# Patient Record
Sex: FEMALE | Race: WHITE
[De-identification: names, ages, dates, MRNs, and addresses within clinical notes are randomized per-mention and may not be internally consistent; named-entity substitution may affect disease eponyms.]

---

## 2023-07-22 ENCOUNTER — HOSPITAL ENCOUNTER (OUTPATIENT)
Dept: HOSPITAL 76 - DI.S | Age: 73
Discharge: HOME | End: 2023-07-22
Attending: PHYSICIAN ASSISTANT
Payer: MEDICARE

## 2023-07-22 DIAGNOSIS — M77.31: ICD-10-CM

## 2023-07-22 DIAGNOSIS — S92.354A: Primary | ICD-10-CM

## 2023-07-22 DIAGNOSIS — M19.071: ICD-10-CM

## 2023-07-22 NOTE — XRAY REPORT
PROCEDURE:  Foot 3 View RT

 

INDICATIONS:  RIGHT FOOT PAIN

 

TECHNIQUE:  3 views of the foot were acquired.  

 

COMPARISON:  None.

 

FINDINGS:  

 

Bones:  Nondisplaced transverse fracture involving the base of the right fifth metatarsal with overly
ing soft tissue edema. Moderate-severe degenerative changes of the right first metatarsophalangeal song
int. Degenerative changes of the dorsal right midfoot. Prominent plantar calcaneal enthesophyte.  No 
suspicious bony lesions.  

 

Soft tissues:  No suspicious soft tissue calcifications or masses.  

 

 

IMPRESSION:  

Nondisplaced transverse base of right fifth metatarsal fracture.

 

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

 

Prominent plantar calcaneal enthesophyte.

 

Dorsal right midfoot degenerative change.

 

 

 

Reviewed by: Saul Gutierres MD on 7/22/2023 12:46 PM [facility]AR[facility]AN

Approved by: Saul Gutierres MD on 7/22/2023 12:46 PM [facility]AR[facility]AN

 

 

Station ID:  [id number]